# Patient Record
Sex: FEMALE | Race: WHITE
[De-identification: names, ages, dates, MRNs, and addresses within clinical notes are randomized per-mention and may not be internally consistent; named-entity substitution may affect disease eponyms.]

---

## 2018-04-14 ENCOUNTER — HOSPITAL ENCOUNTER (EMERGENCY)
Dept: HOSPITAL 17 - NEPE | Age: 58
Discharge: HOME | End: 2018-04-14
Payer: SELF-PAY

## 2018-04-14 VITALS
SYSTOLIC BLOOD PRESSURE: 160 MMHG | DIASTOLIC BLOOD PRESSURE: 82 MMHG | TEMPERATURE: 97.4 F | RESPIRATION RATE: 16 BRPM | OXYGEN SATURATION: 100 % | HEART RATE: 79 BPM

## 2018-04-14 DIAGNOSIS — S50.311A: ICD-10-CM

## 2018-04-14 DIAGNOSIS — T14.8XXA: ICD-10-CM

## 2018-04-14 DIAGNOSIS — R55: ICD-10-CM

## 2018-04-14 DIAGNOSIS — S93.402A: Primary | ICD-10-CM
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
## 2018-04-14 LAB
BASOPHILS # BLD AUTO: 0.1 TH/MM3 (ref 0–0.2)
BASOPHILS NFR BLD: 0.6 % (ref 0–2)
BUN SERPL-MCNC: 5 MG/DL (ref 7–18)
CALCIUM SERPL-MCNC: 8.7 MG/DL (ref 8.5–10.1)
CHLORIDE SERPL-SCNC: 104 MEQ/L (ref 98–107)
CREAT SERPL-MCNC: 0.77 MG/DL (ref 0.5–1)
EOSINOPHIL # BLD: 0.1 TH/MM3 (ref 0–0.4)
EOSINOPHIL NFR BLD: 1.1 % (ref 0–4)
ERYTHROCYTE [DISTWIDTH] IN BLOOD BY AUTOMATED COUNT: 19.6 % (ref 11.6–17.2)
GFR SERPLBLD BASED ON 1.73 SQ M-ARVRAT: 76 ML/MIN (ref 89–?)
GLUCOSE SERPL-MCNC: 94 MG/DL (ref 74–106)
HCO3 BLD-SCNC: 26.4 MEQ/L (ref 21–32)
HCT VFR BLD CALC: 31.4 % (ref 35–46)
HGB BLD-MCNC: 9.6 GM/DL (ref 11.6–15.3)
LYMPHOCYTES # BLD AUTO: 1.5 TH/MM3 (ref 1–4.8)
LYMPHOCYTES NFR BLD AUTO: 18.9 % (ref 9–44)
MAGNESIUM SERPL-MCNC: 2 MG/DL (ref 1.5–2.5)
MCH RBC QN AUTO: 20 PG (ref 27–34)
MCHC RBC AUTO-ENTMCNC: 30.7 % (ref 32–36)
MCV RBC AUTO: 65.2 FL (ref 80–100)
MONOCYTE #: 0.4 TH/MM3 (ref 0–0.9)
MONOCYTES NFR BLD: 4.8 % (ref 0–8)
NEUTROPHILS # BLD AUTO: 5.9 TH/MM3 (ref 1.8–7.7)
NEUTROPHILS NFR BLD AUTO: 74.6 % (ref 16–70)
PLATELET # BLD: 318 TH/MM3 (ref 150–450)
PMV BLD AUTO: 8.3 FL (ref 7–11)
RBC # BLD AUTO: 4.82 MIL/MM3 (ref 4–5.3)
SODIUM SERPL-SCNC: 136 MEQ/L (ref 136–145)
TROPONIN I SERPL-MCNC: (no result) NG/ML (ref 0.02–0.05)
WBC # BLD AUTO: 8 TH/MM3 (ref 4–11)

## 2018-04-14 PROCEDURE — 73110 X-RAY EXAM OF WRIST: CPT

## 2018-04-14 PROCEDURE — 73630 X-RAY EXAM OF FOOT: CPT

## 2018-04-14 PROCEDURE — 83735 ASSAY OF MAGNESIUM: CPT

## 2018-04-14 PROCEDURE — 73610 X-RAY EXAM OF ANKLE: CPT

## 2018-04-14 PROCEDURE — 71045 X-RAY EXAM CHEST 1 VIEW: CPT

## 2018-04-14 PROCEDURE — L1906 AFO MULTILIG ANK SUP PRE OTS: HCPCS

## 2018-04-14 PROCEDURE — 85025 COMPLETE CBC W/AUTO DIFF WBC: CPT

## 2018-04-14 PROCEDURE — 80048 BASIC METABOLIC PNL TOTAL CA: CPT

## 2018-04-14 PROCEDURE — 84443 ASSAY THYROID STIM HORMONE: CPT

## 2018-04-14 PROCEDURE — 96374 THER/PROPH/DIAG INJ IV PUSH: CPT

## 2018-04-14 PROCEDURE — 73080 X-RAY EXAM OF ELBOW: CPT

## 2018-04-14 PROCEDURE — 73564 X-RAY EXAM KNEE 4 OR MORE: CPT

## 2018-04-14 PROCEDURE — E0113 CRUTCH UNDERARM EACH WOOD: HCPCS

## 2018-04-14 PROCEDURE — 93005 ELECTROCARDIOGRAM TRACING: CPT

## 2018-04-14 PROCEDURE — 70450 CT HEAD/BRAIN W/O DYE: CPT

## 2018-04-14 PROCEDURE — 84484 ASSAY OF TROPONIN QUANT: CPT

## 2018-04-14 PROCEDURE — 99285 EMERGENCY DEPT VISIT HI MDM: CPT

## 2018-04-14 PROCEDURE — 82550 ASSAY OF CK (CPK): CPT

## 2018-04-14 PROCEDURE — L3908 WHO COCK-UP NONMOLDE PRE OTS: HCPCS

## 2018-04-14 NOTE — PD
HPI


Chief Complaint:  Fall


Time Seen by Provider:  11:12


Travel History


International Travel<30 days:  No


Contact w/Intl Traveler<30days:  No


Traveled to known affect area:  No





History of Present Illness


HPI


58-year-old female that presents to the ED for evaluation of fall.  Patient had 

a fall today over a curb.  Patient injured her left ankle as well as her right 

elbow and wrist.  Per patient she did not hit her head but she did have a 

syncopal episode after the fall.  Apparently she was helped to get into her 

feet and then she had a passing out event where she had to be helping to the 

ground again the lasted maybe 30 seconds.  She denies any medical issues.  She 

states having problems with her left ankle in the past.  She has no allergies 

to medication.  No back pain or neck pain.  No head injury.  Takes no 

medications.  No blood thinners.  Took a Tylenol before coming.  Per patient 

most the pain is to the left ankle and to the right arm.  She also has pain in 

her right knee with swelling.  She rates her pain 8 out of 10.  Most of the 

pain into the ankle.  Denies any chest pain or shortness of breath.  No 

abdominal pain.  No diarrhea.  No blurred vision or double vision.  Injury 

occurred earlier this morning.





PFSH


Past Medical History


Heart Rhythm Problems:  Yes (murmur)


Musculoskeletal:  Yes (back, tailbone, left ankle sprain)


Tetanus Vaccination:  Unknown


Influenza Vaccination:  No





Past Surgical History


Cholecystectomy:  Yes


Other Surgery:  Yes (bunion)





Social History


Alcohol Use:  No


Tobacco Use:  No (quit 1993)


Substance Use:  No





Allergies-Medications


(Allergen,Severity, Reaction):  


Coded Allergies:  


     No Known Allergies (Unverified , 4/14/18)


Reported Meds & Prescriptions





Reported Meds & Active Scripts


Active


Diclofenac Sodium DR (Diclofenac Sodium) 75 Mg Tabdr 75 Mg PO BID PRN








Review of Systems


Except as stated in HPI:  all other systems reviewed are Neg





Physical Exam


Narrative


GENERAL: 


SKIN: Warm and dry.


HEAD: Atraumatic. Normocephalic. 


EYES: Pupils equal and round 4 mm reactive to light and accommodation. No 

scleral icterus. No injection or drainage. 


ENT: No nasal bleeding or discharge.  Mucous membranes pink and moist.  Tongue 

is midline.  No uvula deviation.


NECK: Trachea midline. No JVD. 


CARDIOVASCULAR: Regular rate and rhythm.  No murmurs, S3, S4.


RESPIRATORY: No accessory muscle use. Clear to auscultation. Breath sounds 

equal bilaterally. 


GASTROINTESTINAL: Abdomen soft, non-tender, nondistended. Hepatic and splenic 

margins not palpable. 


MUSCULOSKELETAL: Extremities without clubbing, cyanosis, or edema. No obvious 

deformities.  Full range of motion of the upper and lower extremities 

bilaterally.  Patient does have swelling and abrasions noted to the right elbow

, right wrist as well as the right knee.  Patient does have pain on the medial 

aspect of the left ankle.  Some soft tissue swelling noted.  2+ pulses 

bilaterally.  Sensation intact bilaterally.  No obvious bony deformity noted.  

No lumbar, thoracic, cervical spine tenderness to palpation.


NEUROLOGICAL: Awake and alert. No obvious cranial nerve deficits.  Motor 

grossly within normal limits. Five out of 5 muscle strength in the arms and 

legs.  Normal speech.


PSYCHIATRIC: Appropriate mood and affect; insight and judgment normal.





Data


Data


Last Documented VS





Vital Signs








  Date Time  Temp Pulse Resp B/P (MAP) Pulse Ox O2 Delivery O2 Flow Rate FiO2


 


4/14/18 10:55 97.4 79 16 160/82 (108) 100   








Orders





 Orders


Electrocardiogram (4/14/18 11:19)


Complete Blood Count With Diff (4/14/18 11:19)


Basic Metabolic Panel (Bmp) (4/14/18 11:19)


Ckmb (Isoenzyme) Profile (4/14/18 11:19)


Troponin I (4/14/18 11:19)


Magnesium (Mg) (4/14/18 11:19)


Thyroid Stimulating Hormone (4/14/18 11:19)


Chest, Single Ap (4/14/18 11:19)


Ct Brain W/O Iv Contrast(Rout) (4/14/18 11:19)


Iv Access Insert/Monitor (4/14/18 11:19)


Ecg Monitoring (4/14/18 11:19)


Ankle, Complete ([id number]) (4/14/18 11:19)


Elbow, Complete (4 Vws) (4/14/18 11:19)


Foot, Complete ([id number]) (4/14/18 11:19)


Knee, Complete (4vws) (4/14/18 11:19)


Wrist, Complete ([id number]) (4/14/18 11:19)


Morphine Inj (Morphine Inj) (4/14/18 11:30)


Ondansetron Inj (Zofran Inj) (4/14/18 11:30)


Ketorolac Inj (Toradol Inj) (4/14/18 12:15)


Splint Or Brace Apply/Monitor (4/14/18 13:13)





Labs





Laboratory Tests








Test


  4/14/18


12:03


 


White Blood Count 8.0 TH/MM3 


 


Red Blood Count 4.82 MIL/MM3 


 


Hemoglobin 9.6 GM/DL 


 


Hematocrit 31.4 % 


 


Mean Corpuscular Volume 65.2 FL 


 


Mean Corpuscular Hemoglobin 20.0 PG 


 


Mean Corpuscular Hemoglobin


Concent 30.7 % 


 


 


Red Cell Distribution Width 19.6 % 


 


Platelet Count 318 TH/MM3 


 


Mean Platelet Volume 8.3 FL 


 


Neutrophils (%) (Auto) 74.6 % 


 


Lymphocytes (%) (Auto) 18.9 % 


 


Monocytes (%) (Auto) 4.8 % 


 


Eosinophils (%) (Auto) 1.1 % 


 


Basophils (%) (Auto) 0.6 % 


 


Neutrophils # (Auto) 5.9 TH/MM3 


 


Lymphocytes # (Auto) 1.5 TH/MM3 


 


Monocytes # (Auto) 0.4 TH/MM3 


 


Eosinophils # (Auto) 0.1 TH/MM3 


 


Basophils # (Auto) 0.1 TH/MM3 


 


CBC Comment DIFF FINAL 


 


Differential Comment  


 


Blood Urea Nitrogen 5 MG/DL 


 


Creatinine 0.77 MG/DL 


 


Random Glucose 94 MG/DL 


 


Calcium Level 8.7 MG/DL 


 


Magnesium Level 2.0 MG/DL 


 


Sodium Level 136 MEQ/L 


 


Potassium Level 4.0 MEQ/L 


 


Chloride Level 104 MEQ/L 


 


Carbon Dioxide Level 26.4 MEQ/L 


 


Anion Gap 6 MEQ/L 


 


Estimat Glomerular Filtration


Rate 76 ML/MIN 


 


 


Total Creatine Kinase 69 U/L 


 


Troponin I


  LESS THAN 0.02


NG/ML


 


Thyroid Stimulating Hormone


3rd Gen 1.390 uIU/ML 


 











MDM


Medical Decision Making


Medical Screen Exam Complete:  Yes


Emergency Medical Condition:  Yes


Medical Record Reviewed:  Yes


Interpretation(s)


CBC & BMP Diagram


4/14/18 12:03








Calcium Level 8.7, Magnesium Level 2.0


EKG shows sinus rhythm with no sign of acute ischemia or arrhythmia but by me 

and attending.


Troponin and CK-MB negative.





Last Impressions








Wrist X-Ray 4/14/18 1119 Signed





Impressions: 





 Service Date/Time:  Saturday, April 14, 2018 11:48 - CONCLUSION: Negative for 





 fracture     Nain Bautista MD  FACR


 


Knee X-Ray 4/14/18 1119 Signed





Impressions: 





 Service Date/Time:  Saturday, April 14, 2018 11:42 - CONCLUSION:  Degenerative 





 changes, no fracture     Nain Bautista MD  FACR


 


Head CT 4/14/18 1119 Signed





Impressions: 





 Service Date/Time:  Saturday, April 14, 2018 11:26 - CONCLUSION: Negative for 

an 





 acute process     Nain Bauitsta MD  FACR


 


Foot X-Ray 4/14/18 1119 Signed





Impressions: 





 Service Date/Time:  Saturday, April 14, 2018 11:39 - CONCLUSION:  Negative for 





 an acute process.     Nain Bautista MD  FACR


 


Elbow X-Ray 4/14/18 1119 Signed





Impressions: 





 Service Date/Time:  Saturday, April 14, 2018 11:50 - CONCLUSION:  Negative for 





 fracture or dislocation. Follow up in 7-10 days is suggested if symptoms 





 persist.      Nain Bautista MD  FACR


 


Chest X-Ray 4/14/18 1119 Signed





Impressions: 





 Service Date/Time:  Saturday, April 14, 2018 11:34 - CONCLUSION: No acute 





 disease.       Nain Bautista MD  FACR


 


Ankle X-Ray 4/14/18 1119 Signed





Impressions: 





 Service Date/Time:  Saturday, April 14, 2018 11:37 - CONCLUSION: Negative for 





 fracture or dislocation. Follow up in 7-10 days is suggested if symptoms 





 persist.    Nain Bautista MD  FACR








Differential Diagnosis


Fracture versus sprain versus strain versus syncope versus bruise versus 

contusion


Narrative Course


58-year-old female that presents to the ED for evaluation of fall and syncope.  

Patient was properly examined and was found to have signs and symptoms 

consistent with fall.  Labs and imaging order.  Labs and imaging show no sign 

of acute disease.  Patient was given pain medication here but declined 

morphine.  She was given Toradol with some relief.  Patient was given 

prescription for the clinic setting for pain.  Given a brace for her wrist as 

well as for her ankle.  Given crutches.  Told to follow-up with PCP this week.  

See ED for worsening symptoms. Case discussed with my attending Dr Maurice who 

was made aware of history and physical and lab and imaging findings and agrees 

with DC.





Diagnosis





 Primary Impression:  


 Fall


 Qualified Codes:  W19.XXXA - Unspecified fall, initial encounter


 Additional Impressions:  


 Vasovagal syncope


 Multiple contusions


 Ankle sprain


 Qualified Codes:  S93.402A - Sprain of unspecified ligament of left ankle, 

initial encounter


Patient Instructions:  General Instructions





***Additional Instructions:  


Take medications as prescribed.


Follow-up with PCP this comming week. 


See ED for any worsening symptoms.


Apply ice or heat as needed for pain


***Med/Other Pt SpecificInfo:  Prescription(s) given


Scripts


Diclofenac Sodium DR (Diclofenac Sodium DR) 75 Mg Tabdr


75 MG PO BID Y for PAIN SCALE 1 TO 10, #20 TAB 0 Refills


   Prov: Farheen Maurice MD         4/14/18


Disposition:  01 DISCHARGE HOME


Condition:  Stable











Jasmeet Simpson Apr 14, 2018 12:12

## 2018-04-14 NOTE — RADRPT
EXAM DATE/TIME:  04/14/2018 11:48 

 

[facility] COMPARISON:     

No previous studies available for comparison.

 

                     

INDICATIONS :     

Trauma. Fall.

                     

 

MEDICAL HISTORY :     

None.          

 

SURGICAL HISTORY :     

Cholecystectomy.   

 

ENCOUNTER:     

Initial                                        

 

ACUITY:     

1 day      

 

PAIN SCORE:     

4/10

 

LOCATION:     

Right  Wrist.

 

FINDINGS:     

Three view examination of the right wrist demonstrates no soft tissue swelling, dislocation, or fract
ure.  The carpal bones are in normal alignment.  The joint spaces are maintained.  Bony mineralizatio
n is normal.

 

CONCLUSION:     Negative for fracture

 

 

 

 Nain Bautista MD FACR on April 14, 2018 at 12:19           

Board Certified Radiologist.

 This report was verified electronically.

## 2018-04-14 NOTE — RADRPT
EXAM DATE/TIME:  04/14/2018 11:26 

 

[facility] COMPARISON:     

No previous studies available for comparison.

 

 

INDICATIONS :     

Head pain due to fall.

                      

 

RADIATION DOSE:     

56.35 CTDIvol (mGy) 

 

 

 

MEDICAL HISTORY :     

None  

 

SURGICAL HISTORY :      

Cholecystectomy. 

 

ENCOUNTER:      

Initial

 

ACUITY:      

1 day

 

PAIN SCALE:      

4/10

 

LOCATION:       

Bilateral cranial 

 

TECHNIQUE:     

Multiple contiguous axial images were obtained of the head.  Using automated exposure control and adj
ustment of the mA and/or kV according to patient size, radiation dose was kept as low as reasonably a
chievable to obtain optimal diagnostic quality images.   DICOM format image data is available electro
nically for review and comparison.  

 

FINDINGS:     

 

CEREBRUM:     

The ventricles are normal for age.  No evidence of midline shift, mass lesion, hemorrhage or acute in
farction.  No extra-axial fluid collections are seen.

 

POSTERIOR FOSSA:     

The cerebellum and brainstem are intact.  The 4th ventricle is midline.  The cerebellopontine angle i
s unremarkable.

 

EXTRACRANIAL:     

The visualized portion of the orbits is intact.

 

SKULL:     

The calvaria is intact.  No evidence of skull fracture.

 

CONCLUSION:     Negative for an acute process 

 

 

 Nain Bautista MD FACR on April 14, 2018 at 11:38           

Board Certified Radiologist.

 This report was verified electronically.

## 2018-04-14 NOTE — RADRPT
EXAM DATE/TIME:  04/14/2018 11:37 

 

[facility] COMPARISON:     

No previous studies available for comparison.

 

                     

INDICATIONS :     

Trauma. Fall.

                     

 

MEDICAL HISTORY :     

None.          

 

SURGICAL HISTORY :     

Cholecystectomy.   

 

ENCOUNTER:     

Initial                                        

 

ACUITY:     

1 day      

 

PAIN SCORE:     

6/10

 

LOCATION:     

Left  Ankle.

 

FINDINGS:     

Three view exam was performed of the left ankle.  The bony structures are in normal alignment.  No ev
idence of fracture, dislocation, or soft tissue swelling.  The ankle mortise is intact.  No radiopaqu
e foreign bodies are seen.  Bony mineralization is normal.

 

CONCLUSION:     Negative for fracture or dislocation. Follow up in 7-10 days is suggested if symptoms
 persist.

 

 

 Nain Bautista MD FACR on April 14, 2018 at 12:15           

Board Certified Radiologist.

 This report was verified electronically.

## 2018-04-14 NOTE — RADRPT
EXAM DATE/TIME:  04/14/2018 11:39 

 

[facility] COMPARISON:     

No previous studies available for comparison.

 

                     

INDICATIONS :     

Trauma. Fall.

                     

 

MEDICAL HISTORY :     

None.          

 

SURGICAL HISTORY :     

Cholecystectomy.   

 

ENCOUNTER:     

Initial                                        

 

ACUITY:     

1 day      

 

PAIN SCORE:     

6/10

 

LOCATION:     

Left  Foot.

 

FINDINGS:     

Three view examination of the left foot demonstrates no soft tissue swelling, dislocation, or fractur
e.   The tarsal bones appear intact.  The interphalangeal and metatarsophalangeal joints are intact. 
 The calcaneus is intact.  Bony mineralization is normal.

 

CONCLUSION:     

Negative for an acute process.

 

 

 

 Nain Bautista MD FACR on April 14, 2018 at 12:16           

Board Certified Radiologist.

 This report was verified electronically.

## 2018-04-14 NOTE — RADRPT
EXAM DATE/TIME:  04/14/2018 11:50 

 

[facility] COMPARISON:     

No previous studies available for comparison.

 

                     

INDICATIONS :     

Trauma. Fall.

                     

 

MEDICAL HISTORY :     

None.          

 

SURGICAL HISTORY :     

Cholecystectomy.   

 

ENCOUNTER:     

Initial                                        

 

ACUITY:     

1 day      

 

PAIN SCORE:     

3/10

 

LOCATION:     

Right  Elbow.

 

FINDINGS:     

Multiple view examination of the right elbow demonstrates no soft tissue swelling, joint effusion, or
 fracture.  The osseous structures are in normal alignment.  Bony mineralization is normal.

 

CONCLUSION:     

Negative for fracture or dislocation. Follow up in 7-10 days is suggested if symptoms persist.

 

 

 

 

 Nain Bautista MD FACR on April 14, 2018 at 12:22           

Board Certified Radiologist.

 This report was verified electronically.

## 2018-04-14 NOTE — RADRPT
EXAM DATE/TIME:  04/14/2018 11:34 

 

[facility] COMPARISON:     

No previous studies available for comparison.

 

                     

INDICATIONS :     

Trauma. Fall.

                     

 

MEDICAL HISTORY :     

None.          

 

SURGICAL HISTORY :        

Cholecystectomy.

 

ENCOUNTER:     

Initial                                        

 

ACUITY:     

1 day      

 

PAIN SCORE:     

0/10

 

LOCATION:     

Bilateral chest 

 

FINDINGS:     

A single view of the chest demonstrates the lungs to be symmetrically aerated without evidence of mas
s, infiltrate or effusion.  The cardiomediastinal contours are unremarkable.  Osseous structures are 
intact.

 

CONCLUSION:     No acute disease.  

 

 

 

 Nain Bautista MD FACR on April 14, 2018 at 12:08           

Board Certified Radiologist.

 This report was verified electronically.

## 2018-04-14 NOTE — EKG
Date Performed: 04/14/2018       Time Performed: 12:07:51

 

PTAGE:      58 years

 

EKG:      Sinus rhythm 

 

 NORMAL ECG 

 

NO PREVIOUS TRACING            

 

DOCTOR:   Ruchi Douglas  Interpretating Date/Time  04/14/2018 16:29:43

## 2018-04-14 NOTE — RADRPT
EXAM DATE/TIME:  04/14/2018 11:42 

 

[facility] COMPARISON:     

No previous studies available for comparison.

 

                     

INDICATIONS :     

Trauma. Fall.

                     

 

MEDICAL HISTORY :     

None.          

 

SURGICAL HISTORY :     

Cholecystectomy.   

 

ENCOUNTER:     

Initial                                        

 

ACUITY:     

1 day      

 

PAIN SCORE:     

3/10

 

LOCATION:     

Right  Knee.

 

FINDINGS:     

There is loss of articular cartilage medial compartment.  Alignment otherwise anatomic.  Fracture not
 appreciated.

 

CONCLUSION:     

Degenerative changes, no fracture

 

 

 

 Nain Bautista MD FACR on April 14, 2018 at 12:18           

Board Certified Radiologist.

 This report was verified electronically.